# Patient Record
Sex: FEMALE | Race: WHITE | NOT HISPANIC OR LATINO
[De-identification: names, ages, dates, MRNs, and addresses within clinical notes are randomized per-mention and may not be internally consistent; named-entity substitution may affect disease eponyms.]

---

## 2017-10-11 ENCOUNTER — RX RENEWAL (OUTPATIENT)
Age: 62
End: 2017-10-11

## 2017-10-11 RX ORDER — LISINOPRIL 10 MG/1
10 TABLET ORAL
Qty: 90 | Refills: 0 | Status: ACTIVE | COMMUNITY
Start: 2017-10-11 | End: 1900-01-01

## 2019-12-03 ENCOUNTER — INPATIENT (INPATIENT)
Facility: HOSPITAL | Age: 64
LOS: 0 days | Discharge: HOME | DRG: 305 | End: 2019-12-04
Attending: STUDENT IN AN ORGANIZED HEALTH CARE EDUCATION/TRAINING PROGRAM | Admitting: STUDENT IN AN ORGANIZED HEALTH CARE EDUCATION/TRAINING PROGRAM
Payer: COMMERCIAL

## 2019-12-03 VITALS
WEIGHT: 235.89 LBS | SYSTOLIC BLOOD PRESSURE: 198 MMHG | HEIGHT: 65 IN | RESPIRATION RATE: 20 BRPM | OXYGEN SATURATION: 96 % | HEART RATE: 114 BPM | DIASTOLIC BLOOD PRESSURE: 110 MMHG

## 2019-12-03 DIAGNOSIS — F41.9 ANXIETY DISORDER, UNSPECIFIED: ICD-10-CM

## 2019-12-03 DIAGNOSIS — R63.8 OTHER SYMPTOMS AND SIGNS CONCERNING FOOD AND FLUID INTAKE: ICD-10-CM

## 2019-12-03 DIAGNOSIS — R04.0 EPISTAXIS: ICD-10-CM

## 2019-12-03 DIAGNOSIS — E78.5 HYPERLIPIDEMIA, UNSPECIFIED: ICD-10-CM

## 2019-12-03 DIAGNOSIS — I10 ESSENTIAL (PRIMARY) HYPERTENSION: ICD-10-CM

## 2019-12-03 DIAGNOSIS — J45.909 UNSPECIFIED ASTHMA, UNCOMPLICATED: ICD-10-CM

## 2019-12-03 DIAGNOSIS — E11.40 TYPE 2 DIABETES MELLITUS WITH DIABETIC NEUROPATHY, UNSPECIFIED: ICD-10-CM

## 2019-12-03 LAB
ANION GAP SERPL CALC-SCNC: 16 MMOL/L — SIGNIFICANT CHANGE UP (ref 5–17)
ANION GAP SERPL CALC-SCNC: 18 MMOL/L — HIGH (ref 5–17)
APTT BLD: 39.9 SEC — HIGH (ref 27.5–36.3)
BASOPHILS # BLD AUTO: 0.03 K/UL — SIGNIFICANT CHANGE UP (ref 0–0.2)
BASOPHILS NFR BLD AUTO: 0.5 % — SIGNIFICANT CHANGE UP (ref 0–2)
BLD GP AB SCN SERPL QL: NEGATIVE — SIGNIFICANT CHANGE UP
BUN SERPL-MCNC: 14 MG/DL — SIGNIFICANT CHANGE UP (ref 7–23)
BUN SERPL-MCNC: 14 MG/DL — SIGNIFICANT CHANGE UP (ref 7–23)
CALCIUM SERPL-MCNC: 9.2 MG/DL — SIGNIFICANT CHANGE UP (ref 8.4–10.5)
CALCIUM SERPL-MCNC: 9.6 MG/DL — SIGNIFICANT CHANGE UP (ref 8.4–10.5)
CHLORIDE SERPL-SCNC: 100 MMOL/L — SIGNIFICANT CHANGE UP (ref 96–108)
CHLORIDE SERPL-SCNC: 99 MMOL/L — SIGNIFICANT CHANGE UP (ref 96–108)
CO2 SERPL-SCNC: 22 MMOL/L — SIGNIFICANT CHANGE UP (ref 22–31)
CO2 SERPL-SCNC: 22 MMOL/L — SIGNIFICANT CHANGE UP (ref 22–31)
CREAT SERPL-MCNC: 0.46 MG/DL — LOW (ref 0.5–1.3)
CREAT SERPL-MCNC: 0.47 MG/DL — LOW (ref 0.5–1.3)
EOSINOPHIL # BLD AUTO: 0.01 K/UL — SIGNIFICANT CHANGE UP (ref 0–0.5)
EOSINOPHIL NFR BLD AUTO: 0.2 % — SIGNIFICANT CHANGE UP (ref 0–6)
GLUCOSE BLDC GLUCOMTR-MCNC: 113 MG/DL — HIGH (ref 70–99)
GLUCOSE SERPL-MCNC: 116 MG/DL — HIGH (ref 70–99)
GLUCOSE SERPL-MCNC: 119 MG/DL — HIGH (ref 70–99)
HCT VFR BLD CALC: 35.4 % — SIGNIFICANT CHANGE UP (ref 34.5–45)
HCT VFR BLD CALC: 36 % — SIGNIFICANT CHANGE UP (ref 34.5–45)
HGB BLD-MCNC: 11.3 G/DL — LOW (ref 11.5–15.5)
HGB BLD-MCNC: 11.6 G/DL — SIGNIFICANT CHANGE UP (ref 11.5–15.5)
IMM GRANULOCYTES NFR BLD AUTO: 0.3 % — SIGNIFICANT CHANGE UP (ref 0–1.5)
INR BLD: 1.09 — SIGNIFICANT CHANGE UP (ref 0.88–1.16)
LYMPHOCYTES # BLD AUTO: 0.66 K/UL — LOW (ref 1–3.3)
LYMPHOCYTES # BLD AUTO: 10.1 % — LOW (ref 13–44)
MCHC RBC-ENTMCNC: 29.7 PG — SIGNIFICANT CHANGE UP (ref 27–34)
MCHC RBC-ENTMCNC: 30.1 PG — SIGNIFICANT CHANGE UP (ref 27–34)
MCHC RBC-ENTMCNC: 31.4 GM/DL — LOW (ref 32–36)
MCHC RBC-ENTMCNC: 32.8 GM/DL — SIGNIFICANT CHANGE UP (ref 32–36)
MCV RBC AUTO: 91.9 FL — SIGNIFICANT CHANGE UP (ref 80–100)
MCV RBC AUTO: 94.7 FL — SIGNIFICANT CHANGE UP (ref 80–100)
MONOCYTES # BLD AUTO: 0.47 K/UL — SIGNIFICANT CHANGE UP (ref 0–0.9)
MONOCYTES NFR BLD AUTO: 7.2 % — SIGNIFICANT CHANGE UP (ref 2–14)
NEUTROPHILS # BLD AUTO: 5.36 K/UL — SIGNIFICANT CHANGE UP (ref 1.8–7.4)
NEUTROPHILS NFR BLD AUTO: 81.7 % — HIGH (ref 43–77)
NRBC # BLD: 0 /100 WBCS — SIGNIFICANT CHANGE UP (ref 0–0)
NRBC # BLD: 0 /100 WBCS — SIGNIFICANT CHANGE UP (ref 0–0)
PLATELET # BLD AUTO: 143 K/UL — LOW (ref 150–400)
PLATELET # BLD AUTO: 144 K/UL — LOW (ref 150–400)
POTASSIUM SERPL-MCNC: 4.6 MMOL/L — SIGNIFICANT CHANGE UP (ref 3.5–5.3)
POTASSIUM SERPL-MCNC: 4.8 MMOL/L — SIGNIFICANT CHANGE UP (ref 3.5–5.3)
POTASSIUM SERPL-SCNC: 4.6 MMOL/L — SIGNIFICANT CHANGE UP (ref 3.5–5.3)
POTASSIUM SERPL-SCNC: 4.8 MMOL/L — SIGNIFICANT CHANGE UP (ref 3.5–5.3)
PROTHROM AB SERPL-ACNC: 12.4 SEC — SIGNIFICANT CHANGE UP (ref 10–12.9)
RBC # BLD: 3.8 M/UL — SIGNIFICANT CHANGE UP (ref 3.8–5.2)
RBC # BLD: 3.85 M/UL — SIGNIFICANT CHANGE UP (ref 3.8–5.2)
RBC # FLD: 14 % — SIGNIFICANT CHANGE UP (ref 10.3–14.5)
RBC # FLD: 14.1 % — SIGNIFICANT CHANGE UP (ref 10.3–14.5)
RH IG SCN BLD-IMP: POSITIVE — SIGNIFICANT CHANGE UP
SODIUM SERPL-SCNC: 138 MMOL/L — SIGNIFICANT CHANGE UP (ref 135–145)
SODIUM SERPL-SCNC: 139 MMOL/L — SIGNIFICANT CHANGE UP (ref 135–145)
WBC # BLD: 6.25 K/UL — SIGNIFICANT CHANGE UP (ref 3.8–10.5)
WBC # BLD: 6.55 K/UL — SIGNIFICANT CHANGE UP (ref 3.8–10.5)
WBC # FLD AUTO: 6.25 K/UL — SIGNIFICANT CHANGE UP (ref 3.8–10.5)
WBC # FLD AUTO: 6.55 K/UL — SIGNIFICANT CHANGE UP (ref 3.8–10.5)

## 2019-12-03 PROCEDURE — 99285 EMERGENCY DEPT VISIT HI MDM: CPT

## 2019-12-03 PROCEDURE — 99223 1ST HOSP IP/OBS HIGH 75: CPT | Mod: GC

## 2019-12-03 RX ORDER — ATORVASTATIN CALCIUM 80 MG/1
40 TABLET, FILM COATED ORAL AT BEDTIME
Refills: 0 | Status: DISCONTINUED | OUTPATIENT
Start: 2019-12-03 | End: 2019-12-04

## 2019-12-03 RX ORDER — SODIUM CHLORIDE 9 MG/ML
1000 INJECTION INTRAMUSCULAR; INTRAVENOUS; SUBCUTANEOUS ONCE
Refills: 0 | Status: COMPLETED | OUTPATIENT
Start: 2019-12-03 | End: 2019-12-03

## 2019-12-03 RX ORDER — OXYMETAZOLINE HYDROCHLORIDE 0.5 MG/ML
1 SPRAY NASAL ONCE
Refills: 0 | Status: COMPLETED | OUTPATIENT
Start: 2019-12-03 | End: 2019-12-03

## 2019-12-03 RX ORDER — LISINOPRIL 2.5 MG/1
40 TABLET ORAL ONCE
Refills: 0 | Status: COMPLETED | OUTPATIENT
Start: 2019-12-03 | End: 2019-12-03

## 2019-12-03 RX ORDER — SODIUM CHLORIDE 9 MG/ML
1000 INJECTION, SOLUTION INTRAVENOUS
Refills: 0 | Status: DISCONTINUED | OUTPATIENT
Start: 2019-12-03 | End: 2019-12-04

## 2019-12-03 RX ORDER — INSULIN LISPRO 100/ML
VIAL (ML) SUBCUTANEOUS
Refills: 0 | Status: DISCONTINUED | OUTPATIENT
Start: 2019-12-03 | End: 2019-12-04

## 2019-12-03 RX ORDER — DULOXETINE HYDROCHLORIDE 30 MG/1
20 CAPSULE, DELAYED RELEASE ORAL DAILY
Refills: 0 | Status: DISCONTINUED | OUTPATIENT
Start: 2019-12-03 | End: 2019-12-04

## 2019-12-03 RX ORDER — HYDRALAZINE HCL 50 MG
5 TABLET ORAL ONCE
Refills: 0 | Status: COMPLETED | OUTPATIENT
Start: 2019-12-03 | End: 2019-12-03

## 2019-12-03 RX ORDER — GABAPENTIN 400 MG/1
800 CAPSULE ORAL ONCE
Refills: 0 | Status: COMPLETED | OUTPATIENT
Start: 2019-12-03 | End: 2019-12-03

## 2019-12-03 RX ORDER — OXYCODONE AND ACETAMINOPHEN 5; 325 MG/1; MG/1
1 TABLET ORAL ONCE
Refills: 0 | Status: DISCONTINUED | OUTPATIENT
Start: 2019-12-03 | End: 2019-12-03

## 2019-12-03 RX ORDER — DULOXETINE HYDROCHLORIDE 30 MG/1
20 CAPSULE, DELAYED RELEASE ORAL ONCE
Refills: 0 | Status: COMPLETED | OUTPATIENT
Start: 2019-12-03 | End: 2019-12-03

## 2019-12-03 RX ORDER — GLUCAGON INJECTION, SOLUTION 0.5 MG/.1ML
1 INJECTION, SOLUTION SUBCUTANEOUS ONCE
Refills: 0 | Status: DISCONTINUED | OUTPATIENT
Start: 2019-12-03 | End: 2019-12-04

## 2019-12-03 RX ORDER — LISINOPRIL 2.5 MG/1
40 TABLET ORAL ONCE
Refills: 0 | Status: DISCONTINUED | OUTPATIENT
Start: 2019-12-03 | End: 2019-12-03

## 2019-12-03 RX ORDER — LISINOPRIL 2.5 MG/1
40 TABLET ORAL DAILY
Refills: 0 | Status: DISCONTINUED | OUTPATIENT
Start: 2019-12-03 | End: 2019-12-04

## 2019-12-03 RX ORDER — SODIUM CHLORIDE 0.65 %
1 AEROSOL, SPRAY (ML) NASAL THREE TIMES A DAY
Refills: 0 | Status: DISCONTINUED | OUTPATIENT
Start: 2019-12-03 | End: 2019-12-04

## 2019-12-03 RX ORDER — DEXTROSE 50 % IN WATER 50 %
25 SYRINGE (ML) INTRAVENOUS ONCE
Refills: 0 | Status: DISCONTINUED | OUTPATIENT
Start: 2019-12-03 | End: 2019-12-04

## 2019-12-03 RX ORDER — OXYCODONE AND ACETAMINOPHEN 5; 325 MG/1; MG/1
2 TABLET ORAL EVERY 4 HOURS
Refills: 0 | Status: DISCONTINUED | OUTPATIENT
Start: 2019-12-03 | End: 2019-12-04

## 2019-12-03 RX ORDER — GABAPENTIN 400 MG/1
800 CAPSULE ORAL THREE TIMES A DAY
Refills: 0 | Status: DISCONTINUED | OUTPATIENT
Start: 2019-12-03 | End: 2019-12-04

## 2019-12-03 RX ORDER — DEXTROSE 50 % IN WATER 50 %
15 SYRINGE (ML) INTRAVENOUS ONCE
Refills: 0 | Status: DISCONTINUED | OUTPATIENT
Start: 2019-12-03 | End: 2019-12-04

## 2019-12-03 RX ADMIN — DULOXETINE HYDROCHLORIDE 20 MILLIGRAM(S): 30 CAPSULE, DELAYED RELEASE ORAL at 17:28

## 2019-12-03 RX ADMIN — Medication 1 SPRAY(S): at 22:42

## 2019-12-03 RX ADMIN — GABAPENTIN 800 MILLIGRAM(S): 400 CAPSULE ORAL at 22:42

## 2019-12-03 RX ADMIN — Medication 0.5 MILLIGRAM(S): at 18:48

## 2019-12-03 RX ADMIN — ATORVASTATIN CALCIUM 40 MILLIGRAM(S): 80 TABLET, FILM COATED ORAL at 22:20

## 2019-12-03 RX ADMIN — OXYCODONE AND ACETAMINOPHEN 1 TABLET(S): 5; 325 TABLET ORAL at 17:29

## 2019-12-03 RX ADMIN — SODIUM CHLORIDE 1000 MILLILITER(S): 9 INJECTION INTRAMUSCULAR; INTRAVENOUS; SUBCUTANEOUS at 14:15

## 2019-12-03 RX ADMIN — LISINOPRIL 40 MILLIGRAM(S): 2.5 TABLET ORAL at 14:18

## 2019-12-03 RX ADMIN — Medication 5 MILLIGRAM(S): at 13:25

## 2019-12-03 RX ADMIN — GABAPENTIN 800 MILLIGRAM(S): 400 CAPSULE ORAL at 17:29

## 2019-12-03 RX ADMIN — OXYMETAZOLINE HYDROCHLORIDE 1 SPRAY(S): 0.5 SPRAY NASAL at 13:20

## 2019-12-03 RX ADMIN — OXYCODONE AND ACETAMINOPHEN 1 TABLET(S): 5; 325 TABLET ORAL at 15:35

## 2019-12-03 RX ADMIN — OXYCODONE AND ACETAMINOPHEN 1 TABLET(S): 5; 325 TABLET ORAL at 22:20

## 2019-12-03 RX ADMIN — OXYCODONE AND ACETAMINOPHEN 1 TABLET(S): 5; 325 TABLET ORAL at 15:39

## 2019-12-03 NOTE — H&P ADULT - NSHPSOCIALHISTORY_GEN_ALL_CORE
Pt lives alone, independent with ADLs, is a writer.    Denies tobacco use, drinks 2 glasses wine/night, denies illicit drug use, however states she has taken percocet 10/650 QID for ~10 years.

## 2019-12-03 NOTE — H&P ADULT - PROBLEM SELECTOR PLAN 3
Pt with longstanding diabetes, states she takes metformin BID. Has chronic bilateral neuropathy of lower extremities with significant pain and decreased sensation. States she takes cymbalta 20mg QD, gabapentin 800mg TID, percocet 10/325 QID.  - hold metformin, start mISS  - monitor FSG  - c/w cymbalta 20mg QD  - c/w gabapentin 800mg TID  - gave additional percocet 5/325. Will check I-STOP to assess long-term opiate use. Also counselled on dangers of persistent percocet use, especially in the setting of daily alcohol consumption

## 2019-12-03 NOTE — H&P ADULT - HISTORY OF PRESENT ILLNESS
Ms. Murphy is a 64F with a PMHx of HTN, HLD who presented to the ED with acute onset epistaxis of bright red blood with notable blood clots. She states she was sitting at home when the bleeding started, she notes no clear preceding events. She denies preceding headache or dizziness, trauma, known coagulopathy, anticoagulant/antiplatelet use, mucosal bleeding, or easy bruising. Denies nose picking or sensation of foreign body in the nose. She has not had any episodes of epistaxis in the past. Pt was also noted to be hypertensive to 190s in ED in the setting of not taking her regular lisinopril the morning of admission, and she also complained of anxiety and pain in the bilateral feet, which is chronic. Pt denied recent fevers, chills, chest pain, SOB, abdominal pain, n/v/d/c, numbness, weakness, tingling, sick contacts, recent travel.    On arrival to ED, VS: T 98.7, , /110, RR 20, SpO2 96%    Labs significant for: plt 143, pTT, anion gap 18 -> 16, Cr 0.46    In ED, pt received: cymbalta 20mg PO x1, gabapentin 800mg PO x1, hydralazine 5mg IVP x1, lisinopril 40mg PO x1, ativan 0.5mg PO x1, percocet 5/325 PO x2, afrin, NS 1L x1.    ENT saw pt in ED, nares were suctioned, bilateral rhinorockets were placed, nares were cauterized and topical lidocaine was placed. There was still persistent oozing from the nares, so surgi-seal packing was placed.  suction done and placement of bilateral rhinorockets but still had some residual blood oozing in to the back of the throat. Ms. Murphy is a 64F with a PMHx of HTN, HLD, DM w/ neuropathy, asthma who presented to the ED with acute onset epistaxis of bright red blood with notable blood clots. She states she was woke up and sat up in bed and noticed copious amounts of blood pouring from both nares, though R>L. She denies preceding headache or dizziness, trauma, known coagulopathy, anticoagulant/antiplatelet use, mucosal bleeding, or easy bruising. Denies nose picking or sensation of foreign body in the nose. Denies sensation of a dry nose. States she had mild epistaxis in the past, 7-8 years ago, when she was on multiple aspirin-containing medications. On admission, pt was also noted to be hypertensive to 190s in ED in the setting of not taking her regular lisinopril the morning of admission, and she also complained of anxiety and pain in the bilateral feet, which is chronic 2/2 diabetic neuropathy. Pt denied recent fevers, chills, chest pain, SOB, abdominal pain, n/v/d/c, numbness, weakness, sick contacts, recent travel.    On arrival to ED, VS: T 98.7, , /110, RR 20, SpO2 96%    Labs significant for: plt 143, pTT, anion gap 18 -> 16, Cr 0.46    In ED, pt received: cymbalta 20mg PO x1, gabapentin 800mg PO x1, hydralazine 5mg IVP x1, lisinopril 40mg PO x1, ativan 0.5mg PO x1, percocet 5/325 PO x2, afrin, NS 1L x1.    ENT saw pt in ED, nares were suctioned, bilateral rhinorockets were placed, nares were cauterized and topical lidocaine was placed. There was still persistent oozing from the nares, so surgi-seal packing was placed.  suction done and placement of bilateral rhinorockets but still had some residual blood oozing in to the back of the throat.

## 2019-12-03 NOTE — ED PROVIDER NOTE - CARE PLAN
Principal Discharge DX:	Epistaxis Principal Discharge DX:	Epistaxis  Secondary Diagnosis:	Hypertension

## 2019-12-03 NOTE — ED PROVIDER NOTE - CLINICAL SUMMARY MEDICAL DECISION MAKING FREE TEXT BOX
febrile. initially sbp 190s while actively bleeding. no hypoxia. active R>L epixtaxis significantly improved s/p bx suction>R nares rhinorhocket>L nares cautery>L nares topical lidocaine>L nares rhinorhocket. given persistent oozing seen in oropharynx w/ cough, ent consulted. resolved. hb 11s. AG 18 improved to *** s/p ivf. pain controlled. home anti-htn Rx given. sbp improved to ***. will dc home w/ outpatient ent fu, strict return precautions. pt agrees w/ plan. questions answered. febrile. initially sbp 190s while actively bleeding. no hypoxia. active R>L epixtaxis significantly improved s/p bx suction>R nares rhinorhocket>L nares cautery>L nares topical lidocaine>L nares rhinorhocket. given persistent oozing seen in oropharynx w/ cough, ent consulted. resolved s/p surgi-seal packing. hb 11>11s. AG 18 improved to 16 s/p ivf. home anti-htn Rx given. sbp improved to 180s. facial pain controlled however severe bl chronic pedal neuropathy pain since missing home Rx. s/p gabapentin/cymbalta.    dispo: ent reccs, reassess hr s/p pain control for pedal neuropathy, anticipate outpatient ent fu febrile. initially sbp 190s while actively bleeding. no hypoxia. active R>L epistaxis significantly improved s/p bx suction>R nares rhinorhocket>L nares cautery>L nares topical lidocaine>L nares rhinorhocket. given persistent oozing seen in oropharynx w/ cough, ent consulted. resolved s/p surgi-seal packing. hb 11>11s. AG 18 improved to 16 s/p ivf. home anti-htn Rx given. sbp improved to 180s. facial pain controlled however severe bl chronic pedal neuropathy pain since missing home Rx. s/p gabapentin/cymbalta. also, pt reports subjective oozing in back of throat and anteriorly.    dispo: ent reccs, reassess hr s/p pain control, if persistently elevated hr or continuous ooze anticipate admission for obs

## 2019-12-03 NOTE — ED ADULT NURSE NOTE - OBJECTIVE STATEMENT
Patient presents to the ED with bleeding from bilateral nares x this morning at 9:30.  On arrival patient actively bleeding, coughing up blood.  non-diaphoretic, no pallor.  Bilateral rhino rockets placed by Dr. Holden.  no active bleeding from Nares at this time, however patient states she feels blood in the back of her throat.  ENT consults.

## 2019-12-03 NOTE — ED PROVIDER NOTE - PROGRESS NOTE DETAILS
ent consulted. likely anterior bleed significantly improved s/p bl rhinorhockets however still w/ ooze down nasopharynx/orophayrnx. airway otherwise intact. pt more comfortable. ent consulted. pt reports only minimal oozing anteriorly and in back of throat. pt reports controlled facial pain but significant pedal neuropathy pain since missing her home gabapentin/cymbalta. home percocet dose already given. hb 11s s/p ivf. sbp 180s s/p home anti-htn. will give neuropathy pain Rx and reassess hr. signout from Dr. Holden.  Still hypertensive/ tachycardic.  appears anxious.  says bleeding seems to have subsided but still feels nausea.  ENT says she can f/u with Dr. Cid on discharge but given persistent vs abnormalities, will admit for further management.  ativan for anxiety.

## 2019-12-03 NOTE — ED PROVIDER NOTE - PHYSICAL EXAMINATION
CONST: anxious appearing holding nose bridge, speaking in full sentences  HEAD: atraumatic  EYES: conjunctivae clear  ENT: bright red R>L epistaxis w/ dark blood clots, ooze seen into oropharynx  NECK: supple, no jvd  CARD: rrr no murmurs  CHEST: ctab no r/r/w, no stridor/retractions/tripoding  EXT: FROM, symmetric distal pulses intact  SKIN: warm, dry, no rash, no pedal edema, cap refill <2sec  NEURO: a+ox3, 5/5 strength x4, gross sensation intact x4, normal gait

## 2019-12-03 NOTE — H&P ADULT - PROBLEM SELECTOR PLAN 2
Pt on home lisinopril 40mg PO. Did not take her lisinopril the morning of admisison. Was hypertensive to 190s on presentation to ED, likely in the setting of missed medication use, however, elevated blood pressure may have been exacerbated by anxiety in the setting of her bleed.  - pt received hydralazine and lisinopril in ED  - monitor BP  - restart home lisinopril 40mg PO QD

## 2019-12-03 NOTE — ED ADULT TRIAGE NOTE - CHIEF COMPLAINT QUOTE
BIBA frm home c/o spontaneous epistaxis while at rest watching TV. Also c/o nausea due to blood dripping behind her throat.

## 2019-12-03 NOTE — H&P ADULT - NSHPPHYSICALEXAM_GEN_ALL_CORE
VITAL SIGNS:  T(C): 36.7 (12-03-19 @ 18:23), Max: 37.1 (12-03-19 @ 13:18)  T(F): 98 (12-03-19 @ 18:23), Max: 98.7 (12-03-19 @ 13:18)  HR: 110 (12-03-19 @ 18:23) (110 - 118)  BP: 161/86 (12-03-19 @ 18:23) (161/86 - 198/110)  BP(mean): --  RR: 16 (12-03-19 @ 18:23) (16 - 20)  SpO2: 97% (12-03-19 @ 18:23) (95% - 97%)  Wt(kg): --    PHYSICAL EXAM:    Constitutional: WDWN resting comfortably in bed; NAD  Head: NC/AT  Eyes: PERRL, EOMI, anicteric sclera  ENT: no nasal discharge; uvula midline, no oropharyngeal erythema or exudates; MMM  Neck: supple; no JVD or thyromegaly  Respiratory: CTA B/L; no W/R/R, no retractions  Cardiac: +S1/S2; RRR; no M/R/G; PMI non-displaced  Gastrointestinal: abdomen soft, NT/ND; no rebound or guarding; +BSx4  Genitourinary: normal external genitalia  Back: spine midline, no bony tenderness or step-offs; no CVAT B/L  Extremities: WWP, no clubbing or cyanosis; no peripheral edema  Musculoskeletal: NROM x4; no joint swelling, tenderness or erythema  Vascular: 2+ radial, femoral, DP/PT pulses B/L  Dermatologic: skin warm, dry and intact; no rashes, wounds, or scars  Lymphatic: no submandibular or cervical LAD  Neurologic: AAOx3; CNII-XII grossly intact; no focal deficits  Psychiatric: affect and characteristics of appearance, verbalizations, behaviors are appropriate VITAL SIGNS:  T(C): 36.7 (12-03-19 @ 18:23), Max: 37.1 (12-03-19 @ 13:18)  T(F): 98 (12-03-19 @ 18:23), Max: 98.7 (12-03-19 @ 13:18)  HR: 110 (12-03-19 @ 18:23) (110 - 118)  BP: 161/86 (12-03-19 @ 18:23) (161/86 - 198/110)  BP(mean): --  RR: 16 (12-03-19 @ 18:23) (16 - 20)  SpO2: 97% (12-03-19 @ 18:23) (95% - 97%)  Wt(kg): --    PHYSICAL EXAM:    Constitutional: Overweight adult female, resting comfortably in bed; NAD  Head: NC/AT  Eyes: PERRL, EOMI, anicteric sclera  ENT: no nasal discharge; outside of nares red, but no bleeding or oozing noted inside nares, uvula midline, no oropharyngeal erythema or exudates; MMM  Neck: supple; no JVD or thyromegaly  Respiratory: CTA B/L; no W/R/R, no retractions  Cardiac: +S1/S2; tachycardic; soft holosystolic murmur; PMI non-displaced  Gastrointestinal: abdomen soft, NT/ND; no rebound or guarding; +BSx4  Extremities: WWP, chronic venous stasis changes b/l lower extremities  Musculoskeletal: NROM x4; no joint swelling, tenderness or erythema  Vascular: 2+ radial, femoral, DP/PT pulses B/L  Lymphatic: no submandibular or cervical LAD  Neurologic: AAOx3; CNII-XII grossly intact; decreased tactile sensation b/l lower extremities  Psychiatric: affect and characteristics of appearance, verbalizations, behaviors are appropriate

## 2019-12-03 NOTE — ED PROVIDER NOTE - NSFOLLOWUPINSTRUCTIONS_ED_ALL_ED_FT
PLEASE FOLLOW-UP WITH ENT IN 2 DAYS.                Nosebleed, Adult  A nosebleed is when blood comes out of the nose. Nosebleeds are common. Usually, they are not a sign of a serious condition.  Nosebleeds can happen if a small blood vessel in your nose starts to bleed or if the lining of your nose (mucous membrane) cracks. They are commonly caused by:  Allergies.Colds.Picking your nose.Blowing your nose too hard.An injury from sticking an object into your nose or getting hit in the nose.Dry or cold air.Less common causes of nosebleeds include:  Toxic fumes.Something abnormal in the nose or in the air-filled spaces in the bones of the face (sinuses).Growths in the nose, such as polyps.Medicines or conditions that cause blood to clot slowly.Certain illnesses or procedures that irritate or dry out the nasal passages.Follow these instructions at home:  When you have a nosebleed:        Sit down and tilt your head slightly forward.Use a clean towel or tissue to pinch your nostrils under the bony part of your nose. After 10 minutes, let go of your nose and see if bleeding starts again. Do not release pressure before that time. If there is still bleeding, repeat the pinching and holding for 10 minutes until the bleeding stops.Do not place tissues or gauze in the nose to stop bleeding.Avoid lying down and avoid tilting your head backward. That may make blood collect in the throat and cause gagging or coughing.Use a nasal spray decongestant to help with a nosebleed as told by your health care provider.Do not use petroleum jelly or mineral oil in your nose. It can drip into your lungs.After a nosebleed:     Avoid blowing your nose or sniffing for a number of hours.Avoid straining, lifting, or bending at the waist for several days. You may resume other normal activities as you are able.Use saline spray or a humidifier as told by your health care provider.Aspirin and blood thinners make bleeding more likely. If you are prescribed these medicines and you suffer from nosebleeds:  Ask your health care provider if you should stop taking the medicines or if you should adjust the dose.Do not stop taking medicines that your health care provider has recommended unless told by your health care provider.If your nosebleed was caused by dry mucous membranes, use over-the-counter saline nasal spray or gel. This will keep the mucous membranes moist and allow them to heal. If you must use a lubricant:  Choose one that is water-soluble.Use only as much as you need and use it only as often as needed.Do not lie down until several hours after you use it.Contact a health care provider if:  You have a fever.You get nosebleeds often or more often than usual.You bruise very easily.You have a nosebleed from having something stuck in your nose.You have bleeding in your mouth.You vomit or cough up brown material.You have a nosebleed after you start a new medicine.Get help right away if:  You have a nosebleed after a fall or a head injury.Your nosebleed does not go away after 20 minutes.You feel dizzy or weak.You have unusual bleeding from other parts of your body.You have unusual bruising on other parts of your body.You become sweaty.You vomit blood.This information is not intended to replace advice given to you by your health care provider. Make sure you discuss any questions you have with your health care provider.    Document Released: 09/27/2006 Document Revised: 04/24/2018 Document Reviewed: 07/04/2017  [employer] Interactive Patient Education © 2019 [employer]

## 2019-12-03 NOTE — H&P ADULT - ATTENDING COMMENTS
64 year old female patient with past medical history of hyperlipidemia, DM, neuropathy and asthma presented to the ED with acute onset nose bleeding, patient is s/p cauterization of nasal capillaries patient is admitted for monitoring epistaxis, severe anxiety and hypertensive urgency.    1. Epistaxis  hemostasis achieved  counseled on management  use of humidifier  for recurrence counseled on how to stop bleeding mechanically and using alpha agonist - Afrin    2. Anxiety  psychosocial support is provided  questions answered  reassurance given    3. Hypertensive urgency  patient with systolic blood pressure of 190  restart home BP meds  can add amlodipine to regimen if still suboptimal by AM

## 2019-12-03 NOTE — H&P ADULT - NSICDXPASTMEDICALHX_GEN_ALL_CORE_FT
PAST MEDICAL HISTORY:  HLD (hyperlipidemia)     Hypertension PAST MEDICAL HISTORY:  Asthma     HLD (hyperlipidemia)     Hypertension     Type 2 diabetes mellitus with diabetic neuropathy

## 2019-12-03 NOTE — H&P ADULT - NSHPLABSRESULTS_GEN_ALL_CORE
LABS:                         11.3   6.25  )-----------( 144      ( 03 Dec 2019 15:39 )             36.0     12-03    138  |  100  |  14  ----------------------------<  119<H>  4.6   |  22  |  0.47<L>    Ca    9.2      03 Dec 2019 15:39      PT/INR - ( 03 Dec 2019 12:44 )   PT: 12.4 sec;   INR: 1.09          PTT - ( 03 Dec 2019 12:44 )  PTT:39.9 sec      RADIOLOGY, EKG & ADDITIONAL TESTS: Reviewed.

## 2019-12-03 NOTE — H&P ADULT - PROBLEM SELECTOR PLAN 1
Pt presenting with one day of epistaxis of bright red blood of both nares, R>L, with notable clots. ENT saw pt in ED, performed cauterization, applied topical lidocaine and placed rhinorockets, achieving cessation of bleeding. Hb stable.   - monitor for reactivation of bleeding  - trend CBC to monitor Hb  - f/u ENT recs Improved Pt presenting with one day of epistaxis of bright red blood of both nares, R>L, with notable clots. ENT saw pt in ED, performed cauterization, applied topical lidocaine and placed rhinorockets, achieving cessation of bleeding. Hb stable.   - ENT following, appreciate recs:   - Nasal saline sprays TID, humidified air, emollients applied to septum prn   - Should small volume epistaxis resume, can hold manual pressure, and apply afrin nasal spray, 1 spray, each nostril, BID, x 3 days maximum   - If unresponsive to conservative measures, would consider nasal packing   - Patient can follow up w/ Dr. King (ENT) as needed following discharge

## 2019-12-03 NOTE — ED PROVIDER NOTE - OBJECTIVE STATEMENT
64F nonsmoker, htn on lisinopril recently increased to 40qd (avg sbp 160s), hld, c/o spontaneous birght red R>L epistaxis w/ significant blood clots while sitting around ~9:30a. pt did not take bp Rx today. no ha/dizziness, no cp/sob, no uri/cough, no trauma, no coagulopathy, no antiplatelet/AC, no easy bruising, no gum bleeding, no prior epistaxis.

## 2019-12-03 NOTE — H&P ADULT - PROBLEM SELECTOR PROBLEM 5
Nutrition, metabolism, and development symptoms Uncomplicated asthma, unspecified asthma severity, unspecified whether persistent

## 2019-12-03 NOTE — H&P ADULT - ASSESSMENT
Ms. Murphy is a 64F with a PMHx of HTN, HLD who presented to the ED with acute onset epistaxis of bright red blood with notable blood clots. Ms. Murphy is a 64F with a PMHx of HTN, HLD, DM w/ neuropathy, asthma who presented to the ED with acute onset epistaxis of bright red blood with notable blood clots.

## 2019-12-03 NOTE — PROGRESS NOTE ADULT - SUBJECTIVE AND OBJECTIVE BOX
Patient is a 65 yo F, hx of obesity, newly diagnosed HTN (on lisinopril 40 mg qd), HLD who presented to ED this AM 2/2 to acute onset epistaxis in the setting of hypertensive urgency. Pt indicates epistaxis started this AM shortly after waking up. Denies taking her home BP meds this AM. On initial evaluation by EMS, patient claims SBP > 200. On arrival in ED pt, w/ BP of 198/110. On initial exam in ED, staff attempted to control epistaxis (R > L) via manual pressure, lidocaine, and chemical cautery, which was initially unsuccessful. B/l Rhino Rocket nasal packing was subsequently placed. Pt continued to ooze around packing prompting ENT consultation. Pt seen and examined at bedside. Denies any past episodes of epistaxis, trauma, or manipulation of the nose. Denies the use of any anticoagulation. Coag panel wnl. Endorses anxiety, diaphoresis, palpitations, feeling lightheaded.   PAST MEDICAL & SURGICAL HISTORY:  HLD (hyperlipidemia)  Hypertension  No significant past surgical history  Meds: above   Allergies: NKDA   SHx: denies toxic habits x 3     ICU Vital Signs Last 24 Hrs  T(C): 36.7 (03 Dec 2019 18:23), Max: 37.1 (03 Dec 2019 13:18)  T(F): 98 (03 Dec 2019 18:23), Max: 98.7 (03 Dec 2019 13:18)  HR: 110 (03 Dec 2019 18:23) (110 - 118)  BP: 161/86 (03 Dec 2019 18:23) (161/86 - 198/110)  BP(mean): --  ABP: --  ABP(mean): --  RR: 16 (03 Dec 2019 18:23) (16 - 20)  SpO2: 97% (03 Dec 2019 18:23) (95% - 97%)    PE:   Gen: Mild distress   Nose: b/l rhino rockets in place, minimal oozing of bright red blood from nares. Left rhino rocket removed, nasal cavity suctioned, afrin soaked pledgets applied/removed, nasal endoscopy preformed: no active bleeding, source vessel identified, surgicel placed in nasal cavity. Bacitracin ointment applied to septum. Right nasal cavity addressed in identical manner.   OC/OP: no clots, no evidence of active bleeding in oropharynx.   Neck: soft, NT  Resp: breathing comfortably on RA.                           11.3   6.25  )-----------( 144      ( 03 Dec 2019 15:39 )             36.0   12-03    138  |  100  |  14  ----------------------------<  119<H>  4.6   |  22  |  0.47<L>    PT/INR - ( 03 Dec 2019 12:44 )   PT: 12.4 sec;   INR: 1.09          PTT - ( 03 Dec 2019 12:44 )  PTT:39.9 sec    A/P: 65 yo F w/ epistaxis (R>L) in the setting of hypertensive urgency, s/p control w/ afrin and Surgicel.     -Appropriate BP control   -Trend H/H   -Nasal saline sprays TID, humidified air, emollients applied to septum prn   -Should small volume epistaxis resume, can hold manual pressure, and apply afrin nasal spray, 1 spray, each nostril, BID, x 3 days maximum   -If unresponsive to conservative measures, would consider nasal packing   -Continued management per ED   -Patient can follow up w/ Dr. King (ENT) as needed following discharge

## 2019-12-04 ENCOUNTER — TRANSCRIPTION ENCOUNTER (OUTPATIENT)
Age: 64
End: 2019-12-04

## 2019-12-04 VITALS
RESPIRATION RATE: 18 BRPM | HEART RATE: 102 BPM | OXYGEN SATURATION: 96 % | SYSTOLIC BLOOD PRESSURE: 148 MMHG | TEMPERATURE: 99 F | DIASTOLIC BLOOD PRESSURE: 69 MMHG

## 2019-12-04 DIAGNOSIS — I10 ESSENTIAL (PRIMARY) HYPERTENSION: ICD-10-CM

## 2019-12-04 LAB
ANION GAP SERPL CALC-SCNC: 11 MMOL/L — SIGNIFICANT CHANGE UP (ref 5–17)
BASOPHILS # BLD AUTO: 0.02 K/UL — SIGNIFICANT CHANGE UP (ref 0–0.2)
BASOPHILS NFR BLD AUTO: 0.3 % — SIGNIFICANT CHANGE UP (ref 0–2)
BUN SERPL-MCNC: 18 MG/DL — SIGNIFICANT CHANGE UP (ref 7–23)
CALCIUM SERPL-MCNC: 8.9 MG/DL — SIGNIFICANT CHANGE UP (ref 8.4–10.5)
CHLORIDE SERPL-SCNC: 100 MMOL/L — SIGNIFICANT CHANGE UP (ref 96–108)
CO2 SERPL-SCNC: 26 MMOL/L — SIGNIFICANT CHANGE UP (ref 22–31)
CREAT SERPL-MCNC: 0.76 MG/DL — SIGNIFICANT CHANGE UP (ref 0.5–1.3)
EOSINOPHIL # BLD AUTO: 0.03 K/UL — SIGNIFICANT CHANGE UP (ref 0–0.5)
EOSINOPHIL NFR BLD AUTO: 0.4 % — SIGNIFICANT CHANGE UP (ref 0–6)
GLUCOSE BLDC GLUCOMTR-MCNC: 111 MG/DL — HIGH (ref 70–99)
GLUCOSE BLDC GLUCOMTR-MCNC: 98 MG/DL — SIGNIFICANT CHANGE UP (ref 70–99)
GLUCOSE SERPL-MCNC: 111 MG/DL — HIGH (ref 70–99)
HBA1C BLD-MCNC: 5.8 % — HIGH (ref 4–5.6)
HCT VFR BLD CALC: 34.1 % — LOW (ref 34.5–45)
HCV AB S/CO SERPL IA: 0.1 S/CO — SIGNIFICANT CHANGE UP
HCV AB SERPL-IMP: SIGNIFICANT CHANGE UP
HGB BLD-MCNC: 10.7 G/DL — LOW (ref 11.5–15.5)
IMM GRANULOCYTES NFR BLD AUTO: 0.4 % — SIGNIFICANT CHANGE UP (ref 0–1.5)
LYMPHOCYTES # BLD AUTO: 1.25 K/UL — SIGNIFICANT CHANGE UP (ref 1–3.3)
LYMPHOCYTES # BLD AUTO: 18.6 % — SIGNIFICANT CHANGE UP (ref 13–44)
MAGNESIUM SERPL-MCNC: 1.6 MG/DL — SIGNIFICANT CHANGE UP (ref 1.6–2.6)
MCHC RBC-ENTMCNC: 29.6 PG — SIGNIFICANT CHANGE UP (ref 27–34)
MCHC RBC-ENTMCNC: 31.4 GM/DL — LOW (ref 32–36)
MCV RBC AUTO: 94.5 FL — SIGNIFICANT CHANGE UP (ref 80–100)
MONOCYTES # BLD AUTO: 0.78 K/UL — SIGNIFICANT CHANGE UP (ref 0–0.9)
MONOCYTES NFR BLD AUTO: 11.6 % — SIGNIFICANT CHANGE UP (ref 2–14)
NEUTROPHILS # BLD AUTO: 4.6 K/UL — SIGNIFICANT CHANGE UP (ref 1.8–7.4)
NEUTROPHILS NFR BLD AUTO: 68.7 % — SIGNIFICANT CHANGE UP (ref 43–77)
NRBC # BLD: 0 /100 WBCS — SIGNIFICANT CHANGE UP (ref 0–0)
PHOSPHATE SERPL-MCNC: 3 MG/DL — SIGNIFICANT CHANGE UP (ref 2.5–4.5)
PLATELET # BLD AUTO: 147 K/UL — LOW (ref 150–400)
POTASSIUM SERPL-MCNC: 4.4 MMOL/L — SIGNIFICANT CHANGE UP (ref 3.5–5.3)
POTASSIUM SERPL-SCNC: 4.4 MMOL/L — SIGNIFICANT CHANGE UP (ref 3.5–5.3)
RBC # BLD: 3.61 M/UL — LOW (ref 3.8–5.2)
RBC # FLD: 14.2 % — SIGNIFICANT CHANGE UP (ref 10.3–14.5)
SODIUM SERPL-SCNC: 137 MMOL/L — SIGNIFICANT CHANGE UP (ref 135–145)
WBC # BLD: 6.71 K/UL — SIGNIFICANT CHANGE UP (ref 3.8–10.5)
WBC # FLD AUTO: 6.71 K/UL — SIGNIFICANT CHANGE UP (ref 3.8–10.5)

## 2019-12-04 PROCEDURE — 83036 HEMOGLOBIN GLYCOSYLATED A1C: CPT

## 2019-12-04 PROCEDURE — 36415 COLL VENOUS BLD VENIPUNCTURE: CPT

## 2019-12-04 PROCEDURE — 99285 EMERGENCY DEPT VISIT HI MDM: CPT | Mod: 25

## 2019-12-04 PROCEDURE — 96374 THER/PROPH/DIAG INJ IV PUSH: CPT

## 2019-12-04 PROCEDURE — 86901 BLOOD TYPING SEROLOGIC RH(D): CPT

## 2019-12-04 PROCEDURE — 85610 PROTHROMBIN TIME: CPT

## 2019-12-04 PROCEDURE — 83735 ASSAY OF MAGNESIUM: CPT

## 2019-12-04 PROCEDURE — 30905 CONTROL OF NOSEBLEED: CPT

## 2019-12-04 PROCEDURE — 85027 COMPLETE CBC AUTOMATED: CPT

## 2019-12-04 PROCEDURE — 85025 COMPLETE CBC W/AUTO DIFF WBC: CPT

## 2019-12-04 PROCEDURE — 86803 HEPATITIS C AB TEST: CPT

## 2019-12-04 PROCEDURE — 86900 BLOOD TYPING SEROLOGIC ABO: CPT

## 2019-12-04 PROCEDURE — 80048 BASIC METABOLIC PNL TOTAL CA: CPT

## 2019-12-04 PROCEDURE — 84100 ASSAY OF PHOSPHORUS: CPT

## 2019-12-04 PROCEDURE — 30901 CONTROL OF NOSEBLEED: CPT

## 2019-12-04 PROCEDURE — 86850 RBC ANTIBODY SCREEN: CPT

## 2019-12-04 PROCEDURE — 85730 THROMBOPLASTIN TIME PARTIAL: CPT

## 2019-12-04 PROCEDURE — 99239 HOSP IP/OBS DSCHRG MGMT >30: CPT

## 2019-12-04 PROCEDURE — 82962 GLUCOSE BLOOD TEST: CPT

## 2019-12-04 RX ORDER — METFORMIN HYDROCHLORIDE 850 MG/1
0 TABLET ORAL
Qty: 0 | Refills: 0 | DISCHARGE

## 2019-12-04 RX ORDER — LISINOPRIL 2.5 MG/1
1 TABLET ORAL
Qty: 0 | Refills: 0 | DISCHARGE
Start: 2019-12-04

## 2019-12-04 RX ORDER — DULOXETINE HYDROCHLORIDE 30 MG/1
1 CAPSULE, DELAYED RELEASE ORAL
Qty: 0 | Refills: 0 | DISCHARGE
Start: 2019-12-04

## 2019-12-04 RX ORDER — MAGNESIUM SULFATE 500 MG/ML
4 VIAL (ML) INJECTION ONCE
Refills: 0 | Status: COMPLETED | OUTPATIENT
Start: 2019-12-04 | End: 2019-12-04

## 2019-12-04 RX ORDER — GABAPENTIN 400 MG/1
2 CAPSULE ORAL
Qty: 0 | Refills: 0 | DISCHARGE
Start: 2019-12-04

## 2019-12-04 RX ORDER — SODIUM CHLORIDE 0.65 %
1 AEROSOL, SPRAY (ML) NASAL
Qty: 0 | Refills: 0 | DISCHARGE
Start: 2019-12-04

## 2019-12-04 RX ORDER — ATORVASTATIN CALCIUM 80 MG/1
1 TABLET, FILM COATED ORAL
Qty: 0 | Refills: 0 | DISCHARGE
Start: 2019-12-04

## 2019-12-04 RX ADMIN — LISINOPRIL 40 MILLIGRAM(S): 2.5 TABLET ORAL at 06:23

## 2019-12-04 RX ADMIN — Medication 1 SPRAY(S): at 06:23

## 2019-12-04 RX ADMIN — OXYCODONE AND ACETAMINOPHEN 1 TABLET(S): 5; 325 TABLET ORAL at 00:25

## 2019-12-04 RX ADMIN — OXYCODONE AND ACETAMINOPHEN 2 TABLET(S): 5; 325 TABLET ORAL at 06:37

## 2019-12-04 RX ADMIN — OXYCODONE AND ACETAMINOPHEN 2 TABLET(S): 5; 325 TABLET ORAL at 10:03

## 2019-12-04 RX ADMIN — GABAPENTIN 800 MILLIGRAM(S): 400 CAPSULE ORAL at 06:23

## 2019-12-04 RX ADMIN — OXYCODONE AND ACETAMINOPHEN 2 TABLET(S): 5; 325 TABLET ORAL at 04:28

## 2019-12-04 RX ADMIN — OXYCODONE AND ACETAMINOPHEN 2 TABLET(S): 5; 325 TABLET ORAL at 08:49

## 2019-12-04 RX ADMIN — OXYCODONE AND ACETAMINOPHEN 2 TABLET(S): 5; 325 TABLET ORAL at 00:25

## 2019-12-04 RX ADMIN — OXYCODONE AND ACETAMINOPHEN 2 TABLET(S): 5; 325 TABLET ORAL at 01:29

## 2019-12-04 RX ADMIN — Medication 100 GRAM(S): at 08:39

## 2019-12-04 NOTE — PROGRESS NOTE ADULT - PROBLEM SELECTOR PLAN 1
resolved s/p ENT intervention; monitor CBC; cont. supportive care, nasal saline sprays PRN; per Pt., her daughter is shipping her an air humidifier

## 2019-12-04 NOTE — DISCHARGE NOTE NURSING/CASE MANAGEMENT/SOCIAL WORK - PATIENT PORTAL LINK FT
You can access the FollowMyHealth Patient Portal offered by [facility] by registering at the following website: [email or web address]. By joining [facility]’s FollowMyHealth portal, you will also be able to view your health information using other applications (apps) compatible with our system.

## 2019-12-04 NOTE — DISCHARGE NOTE PROVIDER - HOSPITAL COURSE
Patient is 65 yo F with past medical history of PMHx of HTN, HLD, DM w/ neuropathy, asthma.    Presented with nosebleed, found to have acute onset epistaxis of bright red blood with notable blood clots.        Problem List/Main Diagnoses (system-based):     # Epistaxis    - Pt presenting with one day of epistaxis of bright red blood of both nares, R>L, with notable clots. ENT saw pt in ED, performed cauterization, applied topical lidocaine and placed rhinorockets, achieving cessation of bleeding. Hb stable.     - ENT following, appreciate recs:     - Nasal saline sprays TID, humidified air, emollients applied to septum prn     - Should small volume epistaxis resume, can hold manual pressure, and apply afrin nasal spray, 1 spray, each nostril, BID, x 3 days maximum     - If unresponsive to conservative measures, would consider nasal packing     - Patient can follow up w/ Dr. King (ENT) as needed following discharge.         # Hypertension, unspecified type    - Pt on home lisinopril 40mg PO. Did not take her lisinopril the morning of admission. Was hypertensive to 190s on presentation to ED, likely in the setting of missed medication use, however, elevated blood pressure may have been exacerbated by anxiety in the setting of her bleed.    - pt received hydralazine and lisinopril in ED    - monitor BP    - restarted home lisinopril 40mg PO QD.         # Type 2 diabetes mellitus with diabetic neuropathy, without long-term current use of insulin    Pt with longstanding diabetes, states she takes metformin BID. Has chronic bilateral neuropathy of lower extremities with significant pain and decreased sensation. States she takes cymbalta 20mg QD, gabapentin 800mg TID, percocet 10/325 QID.    - hold metformin, start mISS    - monitor FSG    - c/w cymbalta 20mg QD    - c/w gabapentin 800mg TID    - gave additional percocet 5/325. Will check I-STOP to assess long-term opiate use. Also counselled on dangers of persistent percocet use, especially in the setting of daily alcohol consumption.         # Hyperlipidemia, unspecified hyperlipidemia type    - c/w lipitor 40mg qhs.         # Uncomplicated asthma, unspecified asthma severity, unspecified whether persistent    - Not in acute exacerbation. Breathing comfortable. No wheezing.    - no active intervention.             Inpatient treatment course: cauterization, applied topical lidocaine and placed rhinorockets and surgiseal packing per ENT team with bleeding cessation achieved        New medications: None        Labs to be followed outpatient:     Exam to be followed outpatient: Patient is 63 yo F with past medical history of PMHx of HTN, HLD, DM w/ neuropathy, asthma.    Presented with nosebleed, found to have acute onset epistaxis of bright red blood with notable blood clots.        Problem List/Main Diagnoses (system-based):     # Epistaxis    - Pt presenting with one day of epistaxis of bright red blood of both nares, R>L, with notable clots. ENT saw pt in ED, performed cauterization, applied topical lidocaine and placed rhinorockets, achieving cessation of bleeding. Hb stable.     - ENT following, appreciate recs:     - Nasal saline sprays TID, humidified air, emollients applied to septum prn     - Should small volume epistaxis resume, can hold manual pressure, and apply afrin nasal spray, 1 spray, each nostril, BID, x 3 days maximum     - If unresponsive to conservative measures, would consider nasal packing     - Patient can follow up w/ Dr. King (ENT) as needed following discharge.         # Hypertension, unspecified type    - Pt on home lisinopril 40mg PO. Did not take her lisinopril the morning of admission. Was hypertensive to 190s on presentation to ED, likely in the setting of missed medication use, however, elevated blood pressure may have been exacerbated by anxiety in the setting of her bleed.    - pt received hydralazine and lisinopril in ED    - monitor BP    - restarted home lisinopril 40mg PO QD.         # Type 2 diabetes mellitus with diabetic neuropathy, without long-term current use of insulin    Pt with longstanding diabetes, states she takes metformin BID. Has chronic bilateral neuropathy of lower extremities with significant pain and decreased sensation. States she takes cymbalta 20mg QD, gabapentin 800mg TID, percocet 10/325 QID.    - hold metformin, start mISS    - monitor FSG    - c/w cymbalta 20mg QD    - c/w gabapentin 800mg TID    - gave additional percocet 5/325. Will check I-STOP to assess long-term opiate use. Also counselled on dangers of persistent percocet use, especially in the setting of daily alcohol consumption.         # Hyperlipidemia, unspecified hyperlipidemia type    - c/w lipitor 40mg qhs.         # Uncomplicated asthma, unspecified asthma severity, unspecified whether persistent    - Not in acute exacerbation. Breathing comfortable. No wheezing.    - no active intervention.             Inpatient treatment course: cauterization, applied topical lidocaine and placed rhinorockets and surgiseal packing per ENT team with bleeding cessation achieved. Hydralazine 5mg IV x1 for sBP 190s.        New medications: None        Labs to be followed outpatient: None    Exam to be followed outpatient: Blood pressure, HEENT exam

## 2019-12-04 NOTE — DISCHARGE NOTE PROVIDER - CARE PROVIDER_API CALL
Can King)  Otolaryngology  [address] [place] [address], [address]  [place], [place] [address]  Phone: [phone]  Fax: [phone]  Follow Up Time:

## 2019-12-04 NOTE — DISCHARGE NOTE PROVIDER - NSDCCPCAREPLAN_GEN_ALL_CORE_FT
PRINCIPAL DISCHARGE DIAGNOSIS  Diagnosis: Epistaxis  Assessment and Plan of Treatment: You were diagnosed with epistaxis, which is bleeding from your nose. This may have been caused by your high blood pressure, dry air in your environment, or a combination of the two. You were treated with cauterization of the bleeding blood vessel in the nose, topical lidocaine, rhinorockets and surgiseal packing by the Ears, Nose, and Throat (ENT) team, which stopped the bleeding. After discharge, please use nasal saline sprays three times daily and humidified air, apply an emollient to the septum as needed, and follow up with your primary doctor. Should small volume epistaxis resume, hold manual pressure, and apply afrin nasal spray, 1 spray, each nostril, twice daily, for 3 days maximum. If epistaxis is unresponsive to these conservative measures, please make an appointment to see ENT Dr. Can King for nasal packing. If you experience worsening nose bleed, symptoms of blood loss like headache, heart palpitations, shortness of breath or chest pain, or other concerning symptoms, please seek immediate medical attention.      SECONDARY DISCHARGE DIAGNOSES  Diagnosis: Hypertension  Assessment and Plan of Treatment: You have a known history of high blood pressure prior to your admission. To manage this you are on a medication called Lisinopril. High blood pressure can cause damage to your heart and kidneys and increases your risk of heart attack and stroke. To avoid this, It is important that you continue to take this medication when you are discharged so that you can continue to control your blood pressure. Additionally be sure to follow up with your primary care physician on a regular basis to make sure your blood pressure continues to be well controlled. If you experience symptoms such as but not limited to: sudden onset blurry vision, nausea, vomiting, chest pain, shortness of breath, or palpitations, please go to the nearest emergency room.

## 2019-12-04 NOTE — PROGRESS NOTE ADULT - SUBJECTIVE AND OBJECTIVE BOX
Patient is a 64y old  Female who presents with a chief complaint of Epistaxis (04 Dec 2019 11:18)      INTERVAL HPI/OVERNIGHT EVENTS:    Pt. seen and examined this morning  Pt. feels well, epistaxis resolved  Denies HA, CP, SOB    Review of Systems: 12 point review of systems otherwise negative    MEDICATIONS  (STANDING):  atorvastatin 40 milliGRAM(s) Oral at bedtime  dextrose 5%. 1000 milliLiter(s) (50 mL/Hr) IV Continuous <Continuous>  dextrose 50% Injectable 25 Gram(s) IV Push once  DULoxetine 20 milliGRAM(s) Oral daily  gabapentin 800 milliGRAM(s) Oral three times a day  insulin lispro (HumaLOG) corrective regimen sliding scale   SubCutaneous Before meals and at bedtime  lisinopril 40 milliGRAM(s) Oral daily  sodium chloride 0.65% Nasal 1 Spray(s) Both Nostrils three times a day    MEDICATIONS  (PRN):  dextrose 40% Gel 15 Gram(s) Oral once PRN Blood Glucose LESS THAN 70 milliGRAM(s)/deciliter  glucagon  Injectable 1 milliGRAM(s) IntraMuscular once PRN Glucose LESS THAN 70 milligrams/deciliter  oxycodone    5 mG/acetaminophen 325 mG 2 Tablet(s) Oral every 4 hours PRN Severe Pain (7 - 10)      Allergies    No Known Allergies    Intolerances          Vital Signs Last 24 Hrs  T(C): 37 (04 Dec 2019 14:35), Max: 37 (04 Dec 2019 14:35)  T(F): 98.6 (04 Dec 2019 14:35), Max: 98.6 (04 Dec 2019 14:35)  HR: 102 (04 Dec 2019 14:35) (102 - 115)  BP: 148/69 (04 Dec 2019 14:35) (148/69 - 187/84)  BP(mean): --  RR: 18 (04 Dec 2019 14:35) (16 - 18)  SpO2: 96% (04 Dec 2019 14:35) (94% - 97%)  CAPILLARY BLOOD GLUCOSE      POCT Blood Glucose.: 111 mg/dL (04 Dec 2019 12:00)  POCT Blood Glucose.: 98 mg/dL (04 Dec 2019 05:25)  POCT Blood Glucose.: 113 mg/dL (03 Dec 2019 22:18)        Physical Exam:  (this morning)  Daily     Daily   General:  well-appearing in NAD  HEENT:  nares moist, no visible blood, no pallor  Skin:  [facility]  Neuro:  AAOx3  rest of exam per PGY-1    LABS:                        10.7   6.71  )-----------( 147      ( 04 Dec 2019 05:39 )             34.1     12-04    137  |  100  |  18  ----------------------------<  111<H>  4.4   |  26  |  0.76    Ca    8.9      04 Dec 2019 05:39  Phos  3.0     12-04  Mg     1.6     12-04      PT/INR - ( 03 Dec 2019 12:44 )   PT: 12.4 sec;   INR: 1.09          PTT - ( 03 Dec 2019 12:44 )  PTT:39.9 sec        RADIOLOGY & ADDITIONAL TESTS:    ---------------------------------------------------------------------------  I personally reviewed: [  ]EKG   [  ]CXR    [  ] CT    [  ]Other  ---------------------------------------------------------------------------  PLEASE CHECK WHEN PRESENT:     [  ]Heart Failure     [  ] Acute     [  ] Acute on Chronic     [  ] Chronic  -------------------------------------------------------------------     [  ]Diastolic [HFpEF]     [  ]Systolic [HFrEF]     [  ]Combined [HFpEF & HFrEF]     [  ]Other:  -------------------------------------------------------------------  [  ]DAHLIA     [  ]ATN     [  ]Reneal Medullary Necrosis     [  ]Renal Cortical Necrosis     [  ]Other Pathological Lesions:    [  ]CKD 1  [  ]CKD 2  [  ]CKD 3  [  ]CKD 4  [  ]CKD 5  [  ]Other  -------------------------------------------------------------------  [  ]Other/Unspecified:    --------------------------------------------------------------------    Abdominal Nutritional Status  [  ]Malnutrition: See Nutrition Note  [  ]Cachexia  [  ]Other:   [  ]Supplement Ordered:  [  ]Morbid Obesity (BMI >=40]

## 2019-12-04 NOTE — PROGRESS NOTE ADULT - PROBLEM SELECTOR PLAN 3
HbA1c at goal; cont. diabetic diet, Cymbalta and Neurontin for diabetic neuropathy; hold metformin inpatient

## 2019-12-04 NOTE — DISCHARGE NOTE PROVIDER - NSDCMRMEDTOKEN_GEN_ALL_CORE_FT
atorvastatin 40 mg oral tablet: 1 tab(s) orally once a day (at bedtime)  DULoxetine 20 mg oral delayed release capsule: 1 cap(s) orally once a day  gabapentin 400 mg oral capsule: 2 cap(s) orally 3 times a day  lisinopril 40 mg oral tablet: 1 tab(s) orally once a day  metFORMIN 1000 mg oral tablet:   oxycodone-acetaminophen 5 mg-325 mg oral tablet: 2 tab(s) orally every 4 hours, As needed, Severe Pain (7 - 10)  sodium chloride 0.65% nasal spray: 1 spray(s) nasal 3 times a day

## 2019-12-09 DIAGNOSIS — E11.40 TYPE 2 DIABETES MELLITUS WITH DIABETIC NEUROPATHY, UNSPECIFIED: ICD-10-CM

## 2019-12-09 DIAGNOSIS — F41.9 ANXIETY DISORDER, UNSPECIFIED: ICD-10-CM

## 2019-12-09 DIAGNOSIS — I16.0 HYPERTENSIVE URGENCY: ICD-10-CM

## 2019-12-09 DIAGNOSIS — R00.0 TACHYCARDIA, UNSPECIFIED: ICD-10-CM

## 2019-12-09 DIAGNOSIS — G62.9 POLYNEUROPATHY, UNSPECIFIED: ICD-10-CM

## 2019-12-09 DIAGNOSIS — R04.0 EPISTAXIS: ICD-10-CM

## 2019-12-09 DIAGNOSIS — I10 ESSENTIAL (PRIMARY) HYPERTENSION: ICD-10-CM

## 2019-12-09 DIAGNOSIS — E78.5 HYPERLIPIDEMIA, UNSPECIFIED: ICD-10-CM

## 2019-12-09 DIAGNOSIS — J45.909 UNSPECIFIED ASTHMA, UNCOMPLICATED: ICD-10-CM

## 2020-06-24 ENCOUNTER — EMERGENCY (EMERGENCY)
Facility: HOSPITAL | Age: 65
LOS: 1 days | Discharge: HOME | End: 2020-06-24
Attending: EMERGENCY MEDICINE | Admitting: EMERGENCY MEDICINE
Payer: MEDICARE

## 2020-06-24 VITALS
HEIGHT: 65 IN | OXYGEN SATURATION: 100 % | TEMPERATURE: 98 F | DIASTOLIC BLOOD PRESSURE: 84 MMHG | RESPIRATION RATE: 18 BRPM | SYSTOLIC BLOOD PRESSURE: 157 MMHG | WEIGHT: 235.01 LBS | HEART RATE: 95 BPM

## 2020-06-24 LAB
ALBUMIN SERPL ELPH-MCNC: 3.7 G/DL — SIGNIFICANT CHANGE UP (ref 3.3–5)
ALP SERPL-CCNC: 152 U/L — HIGH (ref 40–120)
ALT FLD-CCNC: 54 U/L — HIGH (ref 10–45)
ANION GAP SERPL CALC-SCNC: 19 MMOL/L — HIGH (ref 5–17)
APTT BLD: 33 SEC — SIGNIFICANT CHANGE UP (ref 27.5–36.3)
AST SERPL-CCNC: 82 U/L — HIGH (ref 10–40)
BASOPHILS # BLD AUTO: 0.03 K/UL — SIGNIFICANT CHANGE UP (ref 0–0.2)
BASOPHILS NFR BLD AUTO: 0.5 % — SIGNIFICANT CHANGE UP (ref 0–2)
BILIRUB SERPL-MCNC: 0.5 MG/DL — SIGNIFICANT CHANGE UP (ref 0.2–1.2)
BUN SERPL-MCNC: 12 MG/DL — SIGNIFICANT CHANGE UP (ref 7–23)
CALCIUM SERPL-MCNC: 8.6 MG/DL — SIGNIFICANT CHANGE UP (ref 8.4–10.5)
CHLORIDE SERPL-SCNC: 105 MMOL/L — SIGNIFICANT CHANGE UP (ref 96–108)
CK MB CFR SERPL CALC: 2.1 NG/ML — SIGNIFICANT CHANGE UP (ref 0–6.7)
CK SERPL-CCNC: 59 U/L — SIGNIFICANT CHANGE UP (ref 25–170)
CO2 SERPL-SCNC: 18 MMOL/L — LOW (ref 22–31)
CREAT SERPL-MCNC: 0.74 MG/DL — SIGNIFICANT CHANGE UP (ref 0.5–1.3)
CRP SERPL-MCNC: 0.14 MG/DL — SIGNIFICANT CHANGE UP (ref 0–0.4)
EOSINOPHIL # BLD AUTO: 0.01 K/UL — SIGNIFICANT CHANGE UP (ref 0–0.5)
EOSINOPHIL NFR BLD AUTO: 0.2 % — SIGNIFICANT CHANGE UP (ref 0–6)
GLUCOSE SERPL-MCNC: 98 MG/DL — SIGNIFICANT CHANGE UP (ref 70–99)
HCT VFR BLD CALC: 35.5 % — SIGNIFICANT CHANGE UP (ref 34.5–45)
HGB BLD-MCNC: 11.5 G/DL — SIGNIFICANT CHANGE UP (ref 11.5–15.5)
IMM GRANULOCYTES NFR BLD AUTO: 0.3 % — SIGNIFICANT CHANGE UP (ref 0–1.5)
INR BLD: 1 — SIGNIFICANT CHANGE UP (ref 0.88–1.16)
LACTATE SERPL-SCNC: 2.3 MMOL/L — HIGH (ref 0.5–2)
LYMPHOCYTES # BLD AUTO: 1.44 K/UL — SIGNIFICANT CHANGE UP (ref 1–3.3)
LYMPHOCYTES # BLD AUTO: 21.8 % — SIGNIFICANT CHANGE UP (ref 13–44)
MCHC RBC-ENTMCNC: 31.3 PG — SIGNIFICANT CHANGE UP (ref 27–34)
MCHC RBC-ENTMCNC: 32.4 GM/DL — SIGNIFICANT CHANGE UP (ref 32–36)
MCV RBC AUTO: 96.5 FL — SIGNIFICANT CHANGE UP (ref 80–100)
MONOCYTES # BLD AUTO: 0.56 K/UL — SIGNIFICANT CHANGE UP (ref 0–0.9)
MONOCYTES NFR BLD AUTO: 8.5 % — SIGNIFICANT CHANGE UP (ref 2–14)
NEUTROPHILS # BLD AUTO: 4.56 K/UL — SIGNIFICANT CHANGE UP (ref 1.8–7.4)
NEUTROPHILS NFR BLD AUTO: 68.7 % — SIGNIFICANT CHANGE UP (ref 43–77)
NRBC # BLD: 0 /100 WBCS — SIGNIFICANT CHANGE UP (ref 0–0)
PLATELET # BLD AUTO: 200 K/UL — SIGNIFICANT CHANGE UP (ref 150–400)
POTASSIUM SERPL-MCNC: 4.3 MMOL/L — SIGNIFICANT CHANGE UP (ref 3.5–5.3)
POTASSIUM SERPL-SCNC: 4.3 MMOL/L — SIGNIFICANT CHANGE UP (ref 3.5–5.3)
PROT SERPL-MCNC: 6.5 G/DL — SIGNIFICANT CHANGE UP (ref 6–8.3)
PROTHROM AB SERPL-ACNC: 11.4 SEC — SIGNIFICANT CHANGE UP (ref 10–12.9)
RBC # BLD: 3.68 M/UL — LOW (ref 3.8–5.2)
RBC # FLD: 14 % — SIGNIFICANT CHANGE UP (ref 10.3–14.5)
SODIUM SERPL-SCNC: 142 MMOL/L — SIGNIFICANT CHANGE UP (ref 135–145)
TROPONIN T SERPL-MCNC: <0.01 NG/ML — SIGNIFICANT CHANGE UP (ref 0–0.01)
WBC # BLD: 6.62 K/UL — SIGNIFICANT CHANGE UP (ref 3.8–10.5)
WBC # FLD AUTO: 6.62 K/UL — SIGNIFICANT CHANGE UP (ref 3.8–10.5)

## 2020-06-24 PROCEDURE — 71045 X-RAY EXAM CHEST 1 VIEW: CPT | Mod: 26

## 2020-06-24 PROCEDURE — 93010 ELECTROCARDIOGRAM REPORT: CPT

## 2020-06-24 PROCEDURE — 99285 EMERGENCY DEPT VISIT HI MDM: CPT | Mod: CS,25

## 2020-06-24 RX ORDER — SODIUM CHLORIDE 9 MG/ML
500 INJECTION INTRAMUSCULAR; INTRAVENOUS; SUBCUTANEOUS ONCE
Refills: 0 | Status: COMPLETED | OUTPATIENT
Start: 2020-06-24 | End: 2020-06-24

## 2020-06-24 NOTE — ED PROVIDER NOTE - PROGRESS NOTE DETAILS
Ruiz (rec'd signout fromDr Ramon) - gen weakness, no acute findings.  UA pending.  Awaiting discharge in morning when she can get into her home. No events, and currently no complaints.  Labs reviewed with patient. NAD. Has been ambulatory in ED without difficulty.  UA clean.  Stable for DC.  Has a PCP Dr Woodall for follow up.

## 2020-06-24 NOTE — ED PROVIDER NOTE - NSFOLLOWUPINSTRUCTIONS_ED_ALL_ED_FT
Please follow up with your doctor as soon as possible.  ===================================================  WEAKNESS - AfterCare(R) Instructions(ER/ED)     Weakness    WHAT YOU NEED TO KNOW:    Weakness is a loss of muscle strength. It may be caused by brain, nerve, or muscle problems. Physical and mental conditions such as heart problems, pregnancy, dehydration, or depression may also cause weakness. Reactions to certain drugs can cause weakness. Parts of your body may become weak if you need to wear a cast or splint or have been on bed rest for a long time.    DISCHARGE INSTRUCTIONS:    Call 911 for any of the following:     You have any of the following signs of a stroke:   Numbness or drooping on one side of your face       Weakness in an arm or leg      Confusion or difficulty speaking      Dizziness, a severe headache, or vision loss      You lose feeling in your weakened body area.      You have electric shock-like feelings down your arms and legs when you flex or move your neck.      You have sudden or increased trouble speaking, swallowing, or breathing.    Return to the emergency department if:     You have severe pain in your back, arms, or legs that worsens.      You have sudden or worsened muscle weakness or loss of movement.      You are not able to control when you urinate or have a bowel movement.    Contact your healthcare provider if:     You feel depressed or anxious.       You have questions or concerns about your condition or care.     Manage weakness:     Use assistive devices as directed. These help protect you from injury. Examples include a walker or cane. Have someone install handrails in your home. These will help you get out of a bathtub or stand up from a toilet. Use a shower chair so you can sit while you shower. Sit down on the toilet or another chair to dry off and put on your clothes. Get help going up and down stairs if your legs are weak.       Go to physical or occupational therapy if directed. A physical therapist can teach you exercises to help strengthen weak muscles. An occupational therapist can show you ways to do your daily activities more easily. For example, light forks and spoons can be easier to use if you have hand weakness. You may also learn ways to organize your household items so you are not moving heavy items.      Balance rest with exercise. Exercise can help increase your muscle strength and energy. Do not exercise for long periods at a time. Take breaks often to rest. Too much exercise can cause muscle strain or make you more tired. Ask your healthcare provider how much exercise is right for you.      Eat a variety of healthy foods. Too much or too little food may cause weakness or tiredness. Ask your healthcare provider what a healthy amount of food is for you. Healthy foods include fruits, vegetables, whole-grain breads, low-fat dairy products, lean meats and fish, nuts, and cooked beans.      Do not smoke. Nicotine and other chemicals in cigarettes and cigars can make your symptoms worse, and can cause lung damage. Ask your healthcare provider for information if you currently smoke and need help to quit. E-cigarettes or smokeless tobacco still contain nicotine. Talk to your healthcare provider before you use these products.       Do not use caffeine, alcohol, or illegal drugs. These may cause muscle twitching, which could lead to worsened weakness.     Follow up with your healthcare provider as directed: Write down your questions so you remember to ask them during your visits.

## 2020-06-24 NOTE — ED ADULT NURSE NOTE - OBJECTIVE STATEMENT
Patient states not feeling well, feeling sick for a long time,  was seen in [facility] ED for weakness and tiredness but was discharged to follow up w/ MD.   was still feeling weak w/ nausea and vomitting X 1 , no chest pain, states w/ SOB.  No difficulty speaking in long sentences.   was in [facility] in January for broken heart syndrome and hematuria, pMHx Asthma, HTN, DM w/ Neuropathy.  EKG sinus rhythm w 1st degree HB in ED.

## 2020-06-24 NOTE — ED PROVIDER NOTE - PSYCHIATRIC, MLM
I received INR from ERIC Peterson with [facility] Alert and oriented to person, place, time/situation. normal mood and affect. no apparent risk to self or others.

## 2020-06-24 NOTE — ED ADULT NURSE REASSESSMENT NOTE - NS ED NURSE REASSESS COMMENT FT1
Patient a/o X 3, anxious, c/o of feeling weak w/ nausea,no vomitting since arrival to ED.  Sinus rhythm on cardiac monitor, vital signs stable.  Left hand PIV #20 in place, all labs sent,n o complications.  REsults and disposition pending.

## 2020-06-24 NOTE — ED PROVIDER NOTE - OBJECTIVE STATEMENT
Pt is a 65F who presents to ED for generalized weakness. Pt states she has been feeling "sick" for the past month and for the past few days feels as though "she is dying". Pt states she has not been improving and went to [facility] this evening for emergent evaluation. Pt has no vomiting, no fever, no chest pain, no shortness of breath. Pt was discharged this evening from the ED- pt states she wanted to go home, but once she got there she did not feel well.

## 2020-06-24 NOTE — ED PROVIDER NOTE - PATIENT PORTAL LINK FT
You can access the FollowMyHealth Patient Portal offered by [facility] by registering at the following website: [email or web address]. By joining [facility]’s FollowMyHealth portal, you will also be able to view your health information using other applications (apps) compatible with our system.

## 2020-06-25 VITALS
HEART RATE: 96 BPM | DIASTOLIC BLOOD PRESSURE: 87 MMHG | RESPIRATION RATE: 20 BRPM | SYSTOLIC BLOOD PRESSURE: 168 MMHG | OXYGEN SATURATION: 97 %

## 2020-06-25 PROBLEM — E78.5 HYPERLIPIDEMIA, UNSPECIFIED: Chronic | Status: ACTIVE | Noted: 2019-12-03

## 2020-06-25 PROBLEM — I10 ESSENTIAL (PRIMARY) HYPERTENSION: Chronic | Status: ACTIVE | Noted: 2019-12-03

## 2020-06-25 PROBLEM — E11.40 TYPE 2 DIABETES MELLITUS WITH DIABETIC NEUROPATHY, UNSPECIFIED: Chronic | Status: ACTIVE | Noted: 2019-12-03

## 2020-06-25 PROBLEM — J45.909 UNSPECIFIED ASTHMA, UNCOMPLICATED: Chronic | Status: ACTIVE | Noted: 2019-12-03

## 2020-06-25 LAB
APPEARANCE UR: CLEAR — SIGNIFICANT CHANGE UP
BILIRUB UR-MCNC: NEGATIVE — SIGNIFICANT CHANGE UP
COLOR SPEC: YELLOW — SIGNIFICANT CHANGE UP
DIFF PNL FLD: ABNORMAL
FERRITIN SERPL-MCNC: 293 NG/ML — HIGH (ref 15–150)
GAS PNL BLDV: SIGNIFICANT CHANGE UP
GLUCOSE UR QL: NEGATIVE — SIGNIFICANT CHANGE UP
KETONES UR-MCNC: NEGATIVE — SIGNIFICANT CHANGE UP
LACTATE SERPL-SCNC: 2.2 MMOL/L — HIGH (ref 0.5–2)
LEUKOCYTE ESTERASE UR-ACNC: NEGATIVE — SIGNIFICANT CHANGE UP
NITRITE UR-MCNC: NEGATIVE — SIGNIFICANT CHANGE UP
PH UR: 6 — SIGNIFICANT CHANGE UP (ref 5–8)
PROT UR-MCNC: 100 MG/DL
SARS-COV-2 RNA SPEC QL NAA+PROBE: SIGNIFICANT CHANGE UP
SP GR SPEC: 1.02 — SIGNIFICANT CHANGE UP (ref 1–1.03)
TROPONIN T SERPL-MCNC: <0.01 NG/ML — SIGNIFICANT CHANGE UP (ref 0–0.01)
UROBILINOGEN FLD QL: 0.2 E.U./DL — SIGNIFICANT CHANGE UP

## 2020-06-25 PROCEDURE — 82962 GLUCOSE BLOOD TEST: CPT

## 2020-06-25 PROCEDURE — 71045 X-RAY EXAM CHEST 1 VIEW: CPT

## 2020-06-25 PROCEDURE — 82550 ASSAY OF CK (CPK): CPT

## 2020-06-25 PROCEDURE — 80053 COMPREHEN METABOLIC PANEL: CPT

## 2020-06-25 PROCEDURE — 82728 ASSAY OF FERRITIN: CPT

## 2020-06-25 PROCEDURE — 71045 X-RAY EXAM CHEST 1 VIEW: CPT | Mod: 26

## 2020-06-25 PROCEDURE — 85025 COMPLETE CBC W/AUTO DIFF WBC: CPT

## 2020-06-25 PROCEDURE — 82330 ASSAY OF CALCIUM: CPT

## 2020-06-25 PROCEDURE — 93005 ELECTROCARDIOGRAM TRACING: CPT

## 2020-06-25 PROCEDURE — 82553 CREATINE MB FRACTION: CPT

## 2020-06-25 PROCEDURE — 84132 ASSAY OF SERUM POTASSIUM: CPT

## 2020-06-25 PROCEDURE — 87635 SARS-COV-2 COVID-19 AMP PRB: CPT

## 2020-06-25 PROCEDURE — 86140 C-REACTIVE PROTEIN: CPT

## 2020-06-25 PROCEDURE — 83605 ASSAY OF LACTIC ACID: CPT

## 2020-06-25 PROCEDURE — 99284 EMERGENCY DEPT VISIT MOD MDM: CPT | Mod: 25

## 2020-06-25 PROCEDURE — 84295 ASSAY OF SERUM SODIUM: CPT

## 2020-06-25 PROCEDURE — 85730 THROMBOPLASTIN TIME PARTIAL: CPT

## 2020-06-25 PROCEDURE — 81001 URINALYSIS AUTO W/SCOPE: CPT

## 2020-06-25 PROCEDURE — 84484 ASSAY OF TROPONIN QUANT: CPT

## 2020-06-25 PROCEDURE — 82803 BLOOD GASES ANY COMBINATION: CPT

## 2020-06-25 PROCEDURE — 36415 COLL VENOUS BLD VENIPUNCTURE: CPT

## 2020-06-25 PROCEDURE — 85610 PROTHROMBIN TIME: CPT

## 2020-06-25 RX ADMIN — SODIUM CHLORIDE 500 MILLILITER(S): 9 INJECTION INTRAMUSCULAR; INTRAVENOUS; SUBCUTANEOUS at 00:32

## 2020-06-25 NOTE — ED ADULT NURSE REASSESSMENT NOTE - NS ED NURSE REASSESS COMMENT FT1
Patient received from ANA CRISTINA Bland, patient sitting in chair, denying CP, SOB. Endorsing nausea and weakness.

## 2020-06-25 NOTE — ED ADULT NURSE REASSESSMENT NOTE - NS ED NURSE REASSESS COMMENT FT1
Pt aware of need for urine sample, provided cup of water. Pt notably anxious and consolable with explaining plan of care.

## 2020-06-28 DIAGNOSIS — R53.1 WEAKNESS: ICD-10-CM

## 2020-06-28 DIAGNOSIS — Z20.828 CONTACT WITH AND (SUSPECTED) EXPOSURE TO OTHER VIRAL COMMUNICABLE DISEASES: ICD-10-CM

## 2021-06-09 NOTE — ED ADULT NURSE NOTE - PRIMARY CARE PROVIDER
n/a Advancement Flap (Single) Text: The defect edges were debeveled with a #15 scalpel blade.  Given the location of the defect and the proximity to free margins a single advancement flap was deemed most appropriate.  Using a sterile surgical marker, an appropriate advancement flap was drawn incorporating the defect and placing the expected incisions within the relaxed skin tension lines where possible.    The area thus outlined was incised deep to adipose tissue with a #15 scalpel blade.  The skin margins were undermined to an appropriate distance in all directions utilizing iris scissors.

## 2025-04-29 NOTE — ED ADULT NURSE NOTE - NS ED NOTE  TALK SOMEONE YN
CC: Elke Koehler is here today for Annual.       Last Pap: 2/19/24  Last mammogram: 7/2/22     Patient's last menses started n/a    If menopausal, have you had any further vaginal bleeding? n/a           Latex: Patient denies allergy to latex.  Medications, tobacco use and allergies reviewed with patient. yes      Patient would like communication of their results via: [employer]    Cell Phone:   Telephone Information:   Mobile [phone]     Okay to leave a message containing results? Yes  Patient's current [facility] status: Active.     Primary MD: Alison King MD      If your visit includes an exam today, would you like an assistant to be present in the room during that time? No        No